# Patient Record
Sex: MALE | Race: WHITE | NOT HISPANIC OR LATINO | URBAN - NONMETROPOLITAN AREA
[De-identification: names, ages, dates, MRNs, and addresses within clinical notes are randomized per-mention and may not be internally consistent; named-entity substitution may affect disease eponyms.]

---

## 2021-05-25 ENCOUNTER — IMPORTED ENCOUNTER (OUTPATIENT)
Dept: URBAN - NONMETROPOLITAN AREA CLINIC 1 | Facility: CLINIC | Age: 61
End: 2021-05-25

## 2021-05-25 PROCEDURE — S0621 ROUTINE OPHTHALMOLOGICAL EXA: HCPCS

## 2021-05-25 NOTE — PATIENT DISCUSSION
alt xt congenitalMyopia-Discussed diagnosis with patient. -Explained that people who are myopic are at a higher risk for developing RD/RT and reviewed associated S&S.-Pt to contact our office if symptoms develop. Presbyopia-Discussed diagnosis with patient. Updated spec Rx given. Recommend lens that will provide comfort as well as protect safety and health of eyes.

## 2022-04-09 ASSESSMENT — VISUAL ACUITY
OS_CC: J1+
OS_SC: 20/20 BLURRY
OD_SC: 20/20
OD_CC: J1+

## 2022-04-09 ASSESSMENT — TONOMETRY
OD_IOP_MMHG: 16
OS_IOP_MMHG: 16

## 2024-01-22 ENCOUNTER — NEW PATIENT (OUTPATIENT)
Dept: RURAL CLINIC 1 | Facility: CLINIC | Age: 64
End: 2024-01-22

## 2024-01-22 DIAGNOSIS — H52.13: ICD-10-CM

## 2024-01-22 DIAGNOSIS — H50.10: ICD-10-CM

## 2024-01-22 DIAGNOSIS — H52.4: ICD-10-CM

## 2024-01-22 PROCEDURE — 92015 DETERMINE REFRACTIVE STATE: CPT

## 2024-01-22 PROCEDURE — 92014 COMPRE OPH EXAM EST PT 1/>: CPT

## 2024-01-22 ASSESSMENT — TONOMETRY
OS_IOP_MMHG: 16
OD_IOP_MMHG: 16

## 2024-01-22 ASSESSMENT — VISUAL ACUITY
OU_CC: 20/20-1
OS_CC: 20/25-1
OD_CC: 20/20

## 2025-04-08 ENCOUNTER — COMPREHENSIVE EXAM (OUTPATIENT)
Age: 65
End: 2025-04-08

## 2025-04-08 DIAGNOSIS — H50.10: ICD-10-CM

## 2025-04-08 DIAGNOSIS — H52.4: ICD-10-CM

## 2025-04-08 DIAGNOSIS — H52.13: ICD-10-CM

## 2025-04-08 PROCEDURE — 92015 DETERMINE REFRACTIVE STATE: CPT

## 2025-04-08 PROCEDURE — 92014 COMPRE OPH EXAM EST PT 1/>: CPT
